# Patient Record
Sex: FEMALE | Race: NATIVE HAWAIIAN OR OTHER PACIFIC ISLANDER | Employment: UNEMPLOYED | URBAN - METROPOLITAN AREA
[De-identification: names, ages, dates, MRNs, and addresses within clinical notes are randomized per-mention and may not be internally consistent; named-entity substitution may affect disease eponyms.]

---

## 2018-06-28 ENCOUNTER — OP HISTORICAL/CONVERTED ENCOUNTER (OUTPATIENT)
Dept: OTHER | Age: 53
End: 2018-06-28

## 2018-12-12 ENCOUNTER — OFFICE VISIT (OUTPATIENT)
Dept: ENDOCRINOLOGY | Age: 53
End: 2018-12-12

## 2018-12-12 VITALS
OXYGEN SATURATION: 99 % | HEIGHT: 57 IN | HEART RATE: 84 BPM | DIASTOLIC BLOOD PRESSURE: 66 MMHG | SYSTOLIC BLOOD PRESSURE: 116 MMHG | WEIGHT: 123 LBS | BODY MASS INDEX: 26.54 KG/M2 | TEMPERATURE: 97.3 F | RESPIRATION RATE: 14 BRPM

## 2018-12-12 DIAGNOSIS — E55.9 VITAMIN D DEFICIENCY: ICD-10-CM

## 2018-12-12 DIAGNOSIS — M81.0 OSTEOPOROSIS, UNSPECIFIED OSTEOPOROSIS TYPE, UNSPECIFIED PATHOLOGICAL FRACTURE PRESENCE: Primary | ICD-10-CM

## 2018-12-12 RX ORDER — ASPIRIN 81 MG/1
81 TABLET ORAL DAILY
COMMUNITY
Start: 2018-12-11

## 2018-12-12 RX ORDER — SIMVASTATIN 20 MG/1
20 TABLET, FILM COATED ORAL
COMMUNITY
Start: 2018-09-20

## 2018-12-12 RX ORDER — MELOXICAM 7.5 MG/1
7.5 TABLET ORAL DAILY
COMMUNITY

## 2018-12-12 RX ORDER — LANOLIN ALCOHOL/MO/W.PET/CERES
1 CREAM (GRAM) TOPICAL DAILY
COMMUNITY
Start: 2018-09-20

## 2018-12-12 RX ORDER — ALENDRONATE SODIUM 70 MG/1
70 TABLET ORAL
Qty: 4 TAB | Refills: 2 | Status: SHIPPED | OUTPATIENT
Start: 2018-12-12 | End: 2018-12-13 | Stop reason: SDUPTHER

## 2018-12-12 RX ORDER — CLOBETASOL PROPIONATE 0.5 MG/G
CREAM TOPICAL
COMMUNITY
Start: 2018-10-01

## 2018-12-12 RX ORDER — MONTELUKAST SODIUM 10 MG/1
10 TABLET ORAL DAILY
COMMUNITY
Start: 2018-09-20

## 2018-12-12 RX ORDER — CONJUGATED ESTROGENS 0.62 MG/G
CREAM VAGINAL AS NEEDED
COMMUNITY
Start: 2018-10-01

## 2018-12-12 RX ORDER — CETIRIZINE HCL 10 MG
TABLET ORAL
COMMUNITY
Start: 2018-11-15

## 2018-12-12 NOTE — PROGRESS NOTES
Ariel Byers is a 52 y.o. female here for   Chief Complaint   Patient presents with    New Patient     referred by [facility]osis       1. Have you been to the ER, urgent care clinic since your last visit? Hospitalized since your last visit? -n/a    2. Have you seen or consulted any other health care providers outside of the [address] [place] [address] since your last visit?   Include any pap smears or colon screening.-n/a

## 2018-12-12 NOTE — PROGRESS NOTES
[facility]               Jaden Tejada MD        [address] [phone] Fax [phone]           Patient Information  Date:2018  Name : Ben Mercado 52 y.o.     YOB: 1965         Referred by: Carlos Alicea NP       Chief Complaint   Patient presents with    New Patient     referred by [facility] for Osteoporosis       History of Present Illness: Ben Mercado is a 52 y.o. female here for evaluation and  management of osteoporosis. she was found to have osteoporosis based on the BMD.   + premature menopause at age 50  No  loss of height  + GERD/no PUD  She had bone mineral density which showed decrease in the BMD in the hip ,slight improvement in the lumbar spine  Prior therapy : Actonel for 2 years, Boniva last 2 years      Dietary calcium Intake:  Minimal amount of dairy in her diet. On calcium and vitamin D supplements. No history of fragility fractures,chronic steroid use, hyperthyroidism. No history of excess alcohol or tobacco abuse. No known history of hypercalcemia,  Kidney stones, family history of kidney stones. Past Medical History:   Diagnosis Date    Hyperlipidemia     Osteoporosis     Postmenopausal      Past Surgical History:   Procedure Laterality Date    HX  SECTION  , , 2003    x3    HX COLONOSCOPY      HX TUBAL LIGATION       Current Outpatient Medications   Medication Sig    alendronate (FOSAMAX) 70 mg tablet Take 1 Tab by mouth every seven (7) days.  30 minutes before breakfast with water, stop Boniva    aspirin delayed-release 81 mg tablet     cetirizine (ZYRTEC) 10 mg tablet     montelukast (SINGULAIR) 10 mg tablet     clobetasol (TEMOVATE) 0.05 % topical cream     simvastatin (ZOCOR) 20 mg tablet     OYSTER SHELL CALCIUM-VIT D3 500 mg(1,250mg) -200 unit per tablet     PREMARIN 0.625 mg/gram vaginal cream     meloxicam (MOBIC) 7.5 mg tablet Take  by mouth daily. No current facility-administered medications for this visit. Allergies   Allergen Reactions    Septra [Sulfamethoxazole-Trimethoprim] Hives and Rash         Review of Systems:  All 10 systems  reviewed and are negative other than mentioned in HPI    Physical Examination:  Blood pressure 116/66, pulse 84, temperature 97.3 °F (36.3 °C), temperature source Oral, resp. rate 14, height 4' 8.8\" (1.443 m), weight 123 lb (55.8 kg), SpO2 99 %. Body mass index is 26.8 kg/m². - General: pleasant, no distress, good eye contact  - HEENT: no exopthalmos, no periorbital edema, no scleral/conjunctival injection, EOMI, no lid lag or stare  - Neck: supple, no thyromegaly, no supraclavicular or dorsocervical fat pads  - Cardiovascular: regular, normal rate, normal S1 and S2,   - Respiratory: clear to auscultation bilaterally  - Gastrointestinal: soft, nontender, nondistended, BS +  - Musculoskeletal: no proximal muscle weakness in upper or lower extremities  - Integumentary:  no rashes, no edema  - Neurological: reflexes 2+ at biceps, no tremor  - Psychiatric: normal mood and affect    Data Reviewed:         Assessment/Plan:     1. Osteoporosis, unspecified osteoporosis type, unspecified pathological fracture presence    2. Vitamin D deficiency        Osteoporosis    Will do basic labs to screen for secondary causes . She was on Boniva, compliant, there is no strong evidence of decreasing hip fractures with Boniva, instead alendronate, risendronate has strong data  Discussed different options, benefits and side effects -denosumab, zoledronic acid  until workup has been completed, switch to alendronate weekly, discontinue Boniva  Daily requirement of calcium is 1000 mg  1200 mg and Vitamin D is 1000 - 2000 Units daily (should  preferably be from food sources). Calcium rich food choices are  low fat dairy products, fortified orange juice,broccoli,salmon. Fall precautions. Weight bearing exercises helps.  Excess alcohol and smoking weakens the bone and hence should be avoided. Thank you for allowing me to participate in the care of this patient. Laura Julian MD    Patient Yobany Lion verbalized understanding      Patient Instructions   Bisphosphonates must  be taken with   6-8 oz plain water at least 30 minutes before the first food, drink, or medication of the day. Do not lie down for at least 30 minutes after taking . Report if you have any heart burn or jaw pain. Side effects:       Irritation of upper gastrointestinal mucosa, bone and  muscle pain may occur. Jaw necrosis has been reported which is usually seen in cancer patients and dental procedure. Atypical fractures(  low trauma fractures )of the femoral shaft have been reported. If new upper leg pain develop,contact the physician to rule out an incomplete  fracture. Follow-up Disposition:  Return in about 6 weeks (around 1/23/2019). Voice-recognition software was used to generate this report, which may result in some phonetic-based errors in the grammar and contents. Even though attempts were made to correct all the mistakes, some may have been missed and remained in the body of the report.

## 2018-12-12 NOTE — PATIENT INSTRUCTIONS
Bisphosphonates must  be taken with   6-8 oz plain water at least 30 minutes before the first food, drink, or medication of the day. Do not lie down for at least 30 minutes after taking . Report if you have any heart burn or jaw pain. Side effects:       Irritation of upper gastrointestinal mucosa, bone and  muscle pain may occur. Jaw necrosis has been reported which is usually seen in cancer patients and dental procedure. Atypical fractures(  low trauma fractures )of the femoral shaft have been reported. If new upper leg pain develop,contact the physician to rule out an incomplete  fracture.

## 2018-12-13 RX ORDER — ALENDRONATE SODIUM 70 MG/1
70 TABLET ORAL
Qty: 4 TAB | Refills: 2 | Status: SHIPPED | OUTPATIENT
Start: 2018-12-13 | End: 2019-02-02

## 2019-01-29 ENCOUNTER — OFFICE VISIT (OUTPATIENT)
Dept: ENDOCRINOLOGY | Age: 54
End: 2019-01-29

## 2019-01-29 VITALS
DIASTOLIC BLOOD PRESSURE: 63 MMHG | TEMPERATURE: 97.8 F | WEIGHT: 126 LBS | SYSTOLIC BLOOD PRESSURE: 97 MMHG | OXYGEN SATURATION: 95 % | HEART RATE: 89 BPM | RESPIRATION RATE: 14 BRPM | BODY MASS INDEX: 27.18 KG/M2 | HEIGHT: 57 IN

## 2019-01-29 DIAGNOSIS — M81.0 OSTEOPOROSIS WITHOUT CURRENT PATHOLOGICAL FRACTURE, UNSPECIFIED OSTEOPOROSIS TYPE: Primary | ICD-10-CM

## 2019-01-29 RX ORDER — BISMUTH SUBSALICYLATE 262 MG
1 TABLET,CHEWABLE ORAL DAILY
COMMUNITY

## 2019-01-29 NOTE — PROGRESS NOTES
Che Yusuf is a 52 y.o. female here for Chief Complaint Patient presents with  
 Osteoporosis 1. Have you been to the ER, urgent care clinic since your last visit? Hospitalized since your last visit? -no 
 
2. Have you seen or consulted any other health care providers outside of the [address] [place] [address] since your last visit?   Include any pap smears or colon screening.-no

## 2019-01-29 NOTE — PROGRESS NOTES
[facility] DIABETES AND ENDOCRINOLOGY Christene Goodpasture ,MD 
 
    [address] [phone] Fax [phone] Patient Information Date:1/29/2019 Name : Catrachita Oropeza 52 y.o.    
YOB: 1965 Referred by: Caryn Saunders NP Chief Complaint Patient presents with  
 Osteoporosis History of Present Illness: Edmar Moreno is a 52 y.o. female here for follow-up of osteoporosis. she was found to have osteoporosis based on the BMD.  
+ premature menopause at age 50 No  loss of height 
+ GERD/no PUD She had bone mineral density which showed decrease in the BMD in the hip ,slight improvement in the lumbar spine Prior therapy : Actonel for 2 years, Boniva last 2 years Dietary calcium Intake: 
Minimal amount of dairy in her diet. On calcium and vitamin D supplements. No history of fragility fractures,chronic steroid use, hyperthyroidism. No history of excess alcohol or tobacco abuse. No known history of hypercalcemia,  Kidney stones, family history of kidney stones. Past Medical History:  
Diagnosis Date  Hyperlipidemia  Osteoporosis  Postmenopausal   
 
Past Surgical History:  
Procedure Laterality Date [address] 2003  
 x3  
 HX COLONOSCOPY  2014  HX TUBAL LIGATION Current Outpatient Medications Medication Sig  
 multivitamin (ONE A DAY) tablet Take 1 Tab by mouth daily.  alendronate (FOSAMAX) 70 mg tablet Take 1 Tab by mouth every seven (7) days. 30 minutes before breakfast with water, stop Boniva  aspirin delayed-release 81 mg tablet  cetirizine (ZYRTEC) 10 mg tablet  montelukast (SINGULAIR) 10 mg tablet  clobetasol (TEMOVATE) 0.05 % topical cream   
 simvastatin (ZOCOR) 20 mg tablet  OYSTER SHELL CALCIUM-VIT D3 500 mg(1,250mg) -200 unit per tablet Take 1 Tab by mouth daily.   
 PREMARIN 0.625 mg/gram vaginal cream   
  meloxicam (MOBIC) 7.5 mg tablet Take  by mouth daily. No current facility-administered medications for this visit. Allergies Allergen Reactions  Septra [Sulfamethoxazole-Trimethoprim] Hives and Rash Review of Systems:  All 10 systems  reviewed and are negative other than mentioned in HPI Physical Examination: 
Blood pressure 97/63, pulse 89, temperature 97.8 °F (36.6 °C), temperature source Oral, resp. rate 14, height 4' 8.8\" (1.443 m), weight 126 lb (57.2 kg), SpO2 95 %. Body mass index is 27.46 kg/m². - General: pleasant, no distress, good eye contact 
- HEENT: no exopthalmos, no periorbital edema, no scleral/conjunctival injection, EOMI, no lid lag or stare 
- Neck: supple, no thyromegaly, no supraclavicular or dorsocervical fat pads - Cardiovascular: regular, normal rate, normal S1 and S2,  
- Respiratory: clear to auscultation bilaterally - Gastrointestinal: soft, nontender, nondistended, BS + 
- Musculoskeletal: no proximal muscle weakness in upper or lower extremities - Integumentary:  no rashes, no edema 
- Neurological: reflexes 2+ at biceps, no tremor - Psychiatric: normal mood and affect Data Reviewed:  
 
 
 
Assessment/Plan:  
 
No diagnosis found. Osteoporosis Workup for secondary causes unrevealing She was on Boniva, compliant, there is no strong evidence of decreasing hip fractures with Boniva, 
 
IV zoledronic acid has been scheduled , she was asked to discontinue alendronate once she gets the infusion Fall precautions. Weight bearing exercises helps. Excess alcohol and smoking weakens the bone and hence should be avoided. Thank you for allowing me to participate in the care of this patient. Aysha Vogel MD 
 
Patient Carly Patrick verbalized understanding There are no Patient Instructions on file for this visit. Follow-up Disposition: Not on File Voice-recognition software was used to generate this report, which may result in some phonetic-based errors in the grammar and contents. Even though attempts were made to correct all the mistakes, some may have been missed and remained in the body of the report.

## 2019-01-31 LAB
ALBUMIN SERPL ELPH-MCNC: 4.1 G/DL (ref 2.9–4.4)
ALBUMIN/GLOB SERPL: 1.2 {RATIO} (ref 0.7–1.7)
ALPHA1 GLOB SERPL ELPH-MCNC: 0.2 G/DL (ref 0–0.4)
ALPHA2 GLOB SERPL ELPH-MCNC: 0.7 G/DL (ref 0.4–1)
B-GLOBULIN SERPL ELPH-MCNC: 1.2 G/DL (ref 0.7–1.3)
BUN SERPL-MCNC: 17 MG/DL (ref 6–24)
BUN/CREAT SERPL: 18 (ref 9–23)
CALCIUM SERPL-MCNC: 10.2 MG/DL (ref 8.7–10.2)
CHLORIDE SERPL-SCNC: 103 MMOL/L (ref 96–106)
CO2 SERPL-SCNC: 24 MMOL/L (ref 20–29)
CREAT SERPL-MCNC: 0.93 MG/DL (ref 0.57–1)
GAMMA GLOB SERPL ELPH-MCNC: 1.1 G/DL (ref 0.4–1.8)
GLOBULIN SER CALC-MCNC: 3.3 G/DL (ref 2.2–3.9)
GLUCOSE SERPL-MCNC: 110 MG/DL (ref 65–99)
M PROTEIN SERPL ELPH-MCNC: NORMAL G/DL
PLEASE NOTE, 011150: NORMAL
POTASSIUM SERPL-SCNC: 4.7 MMOL/L (ref 3.5–5.2)
PROT SERPL-MCNC: 7.4 G/DL (ref 6–8.5)
SODIUM SERPL-SCNC: 142 MMOL/L (ref 134–144)
TRYPTASE SERPL-MCNC: 7 UG/L (ref 2.2–13.2)

## 2019-02-11 RX ORDER — ZOLEDRONIC ACID 5 MG/100ML
5 INJECTION, SOLUTION INTRAVENOUS ONCE
Status: DISCONTINUED | OUTPATIENT
Start: 2019-02-15 | End: 2019-02-15

## 2019-02-14 RX ORDER — ZOLEDRONIC ACID 5 MG/100ML
5 INJECTION, SOLUTION INTRAVENOUS ONCE
Status: COMPLETED | OUTPATIENT
Start: 2019-02-19 | End: 2019-02-19

## 2019-02-15 ENCOUNTER — HOSPITAL ENCOUNTER (OUTPATIENT)
Dept: INFUSION THERAPY | Age: 54
Discharge: HOME | End: 2019-02-15
Payer: OTHER GOVERNMENT

## 2019-02-19 ENCOUNTER — HOSPITAL ENCOUNTER (OUTPATIENT)
Dept: INFUSION THERAPY | Age: 54
Discharge: HOME | End: 2019-02-19
Payer: OTHER GOVERNMENT

## 2019-02-19 VITALS
HEART RATE: 64 BPM | TEMPERATURE: 97 F | SYSTOLIC BLOOD PRESSURE: 120 MMHG | RESPIRATION RATE: 18 BRPM | DIASTOLIC BLOOD PRESSURE: 74 MMHG

## 2019-02-19 PROCEDURE — 74011250636 HC RX REV CODE- 250/636: Performed by: INTERNAL MEDICINE

## 2019-02-19 PROCEDURE — 96374 THER/PROPH/DIAG INJ IV PUSH: CPT

## 2019-02-19 RX ADMIN — ZOLEDRONIC ACID 5 MG: 5 INJECTION, SOLUTION INTRAVENOUS at 13:10

## 2019-02-19 NOTE — PROGRESS NOTES
Outpatient Infusion Center Progress Note 1255 Pt admit to [address] [place] [address] for Reclast ambulatory in stable condition. Assessment completed. No new concerns voiced. PIV started in [facility] with positive blood return. Labs reviewed from 1/29/19, OK to treat. Medication discussed with pt including side effects and possibility of infusion reaction. Pt verbalized understanding. Instructed pt to take Tylenol as needed every 8 hours following infusion per orders for body aches/flu-like symptoms. Pt verbalized understanding. Visit Vitals /84 Pulse 83 Temp 97.7 °F (36.5 °C) Resp 18 Breastfeeding? No  
 
 
Medications: 
Reclast 
 
1355 Pt tolerated treatment well. Pt monitored for 30 minutes post infusion without any s/s of reaction. PIV flushed and removed prior to discharge. D/c home ambulatory in no distress. Pt aware of next appointment scheduled for 2/17/20.

## 2019-04-29 ENCOUNTER — OFFICE VISIT (OUTPATIENT)
Dept: ENDOCRINOLOGY | Age: 54
End: 2019-04-29

## 2019-04-29 VITALS
WEIGHT: 125.6 LBS | SYSTOLIC BLOOD PRESSURE: 106 MMHG | RESPIRATION RATE: 16 BRPM | HEART RATE: 99 BPM | DIASTOLIC BLOOD PRESSURE: 60 MMHG | TEMPERATURE: 97.3 F | BODY MASS INDEX: 27.1 KG/M2 | OXYGEN SATURATION: 99 % | HEIGHT: 57 IN

## 2019-04-29 DIAGNOSIS — M81.0 AGE-RELATED OSTEOPOROSIS WITHOUT CURRENT PATHOLOGICAL FRACTURE: Primary | ICD-10-CM

## 2019-04-29 DIAGNOSIS — E55.9 VITAMIN D DEFICIENCY: ICD-10-CM

## 2019-04-29 NOTE — PROGRESS NOTES
Camila Harrell MD        Patient Information  Date:2019  Name : Nikita Bailey 62 y.o.     YOB: 1965         Referred by: Damir Muñoz NP       Chief Complaint   Patient presents with    Osteoporosis       History of Present Illness: Nikita Bailey is a 62 y.o. female here for follow-up of osteoporosis. she was found to have osteoporosis based on the BMD.   + premature menopause at age 50  No  loss of height  + GERD/no PUD  She had bone mineral density which showed decrease in the BMD in the hip ,slight improvement in the lumbar spine  Prior therapy : Actonel for 2 years, Orbie Ogren last 2 years  She received Reclast in 2019, no myalgia    Dietary calcium Intake:  Minimal amount of dairy in her diet. On calcium and vitamin D supplements. Past Medical History:   Diagnosis Date    Hyperlipidemia     Osteoporosis     Postmenopausal      Past Surgical History:   Procedure Laterality Date    HX  SECTION  , , 2003    x3    HX COLONOSCOPY      HX TUBAL LIGATION       Current Outpatient Medications   Medication Sig    multivitamin (ONE A DAY) tablet Take 1 Tab by mouth daily.  aspirin delayed-release 81 mg tablet     cetirizine (ZYRTEC) 10 mg tablet     montelukast (SINGULAIR) 10 mg tablet     clobetasol (TEMOVATE) 0.05 % topical cream     simvastatin (ZOCOR) 20 mg tablet     OYSTER SHELL CALCIUM-VIT D3 500 mg(1,250mg) -200 unit per tablet Take 1 Tab by mouth daily.  PREMARIN 0.625 mg/gram vaginal cream     meloxicam (MOBIC) 7.5 mg tablet Take  by mouth daily. No current facility-administered medications for this visit.       Allergies   Allergen Reactions    Septra [Sulfamethoxazole-Trimethoprim] Hives and Rash         Review of Systems:  All 10 systems  reviewed and are negative other than mentioned in HPI    Physical Examination:  Blood pressure 106/60, pulse 99, temperature 97.3 °F (36.3 °C), temperature source Oral, resp. rate 16, height 4' 8.8\" (1.443 m), weight 125 lb 9.6 oz (57 kg), SpO2 99 %. Body mass index is 27.37 kg/m². - General: pleasant, no distress, good eye contact  - HEENT: no exopthalmos, no periorbital edema, no scleral/conjunctival injection, EOMI, no lid lag or stare  - Neck: supple,  - Cardiovascular: regular, normal rate, normal S1 and S2,   - Respiratory: clear to auscultation bilaterally  - Gastrointestinal: soft, nontender, nondistended, BS +  - Musculoskeletal: no proximal muscle weakness in upper or lower extremities  - Integumentary:  no rashes, no edema  - Neurological: reflexes 2+ at biceps, no tremor  - Psychiatric: normal mood and affect    Data Reviewed:         Assessment/Plan:     1. Age-related osteoporosis without current pathological fracture        Osteoporosis    Workup for secondary causes unrevealing  She was on Boniva in the past  IV Reclast February 2019    Fall precautions. Weight bearing exercises helps. Excess alcohol and smoking weakens the bone and hence should be avoided. DEXA due 7/2020     Vitamin D deficiency      Thank you for allowing me to participate in the care of this patient. Susan Almeida MD    Patient Alysha Odom verbalized understanding      There are no Patient Instructions on file for this visit. Voice-recognition software was used to generate this report, which may result in some phonetic-based errors in the grammar and contents. Even though attempts were made to correct all the mistakes, some may have been missed and remained in the body of the report.

## 2019-04-29 NOTE — LETTER
4/29/19 Patient: Carolyne Sauceda YOB: 1965 Date of Visit: 4/29/2019 Luc Cintron NP 
[address] [address] VIA Facsimile: [phone] Dear Luc Cintron NP, Thank you for referring Ms. Joe Garcia to [address] [place] [address] for evaluation. My notes for this consultation are attached. If you have questions, please do not hesitate to call me. I look forward to following your patient along with you. Sincerely, Harrison Graham MD

## 2019-04-29 NOTE — PROGRESS NOTES
Adriana Alfaro is a 62 y.o. female here for   Chief Complaint   Patient presents with    Osteoporosis       1. Have you been to the ER, urgent care clinic since your last visit? Hospitalized since your last visit? [facility]    2. Have you seen or consulted any other health care providers outside of the [address] [place] [address] since your last visit? Include any pap smears or colon screening. -PCP last week

## 2020-01-21 ENCOUNTER — HOSPITAL ENCOUNTER (OUTPATIENT)
Dept: LAB | Age: 55
Discharge: HOME | End: 2020-01-21

## 2020-01-21 ENCOUNTER — LAB ONLY (OUTPATIENT)
Dept: ENDOCRINOLOGY | Age: 55
End: 2020-01-21

## 2020-01-21 DIAGNOSIS — E55.9 VITAMIN D DEFICIENCY: ICD-10-CM

## 2020-01-21 DIAGNOSIS — M81.0 AGE-RELATED OSTEOPOROSIS WITHOUT CURRENT PATHOLOGICAL FRACTURE: ICD-10-CM

## 2020-01-21 DIAGNOSIS — M81.0 AGE-RELATED OSTEOPOROSIS WITHOUT CURRENT PATHOLOGICAL FRACTURE: Primary | ICD-10-CM

## 2020-01-21 LAB
25(OH)D3 SERPL-MCNC: 34.1 NG/ML (ref 30–100)
ANION GAP SERPL CALC-SCNC: 5 MMOL/L (ref 5–15)
BUN SERPL-MCNC: 18 MG/DL (ref 6–20)
BUN/CREAT SERPL: 18 (ref 12–20)
CALCIUM SERPL-MCNC: 9.5 MG/DL (ref 8.5–10.1)
CHLORIDE SERPL-SCNC: 108 MMOL/L (ref 97–108)
CO2 SERPL-SCNC: 29 MMOL/L (ref 21–32)
CREAT SERPL-MCNC: 0.98 MG/DL (ref 0.55–1.02)
GLUCOSE SERPL-MCNC: 95 MG/DL (ref 65–100)
POTASSIUM SERPL-SCNC: 4.1 MMOL/L (ref 3.5–5.1)
SODIUM SERPL-SCNC: 142 MMOL/L (ref 136–145)

## 2020-02-04 NOTE — PROGRESS NOTES
Estelle James is a 62 y.o. female here for   Chief Complaint   Patient presents with    Osteoporosis       1. Have you been to the ER, urgent care clinic since your last visit? Hospitalized since your last visit? -no    2. Have you seen or consulted any other health care providers outside of the [address] [place] [address] since your last visit? Include any pap smears or colon screening. -PCP

## 2020-02-05 ENCOUNTER — OFFICE VISIT (OUTPATIENT)
Dept: ENDOCRINOLOGY | Age: 55
End: 2020-02-05

## 2020-02-05 VITALS
DIASTOLIC BLOOD PRESSURE: 78 MMHG | HEART RATE: 75 BPM | WEIGHT: 123 LBS | TEMPERATURE: 97.4 F | HEIGHT: 57 IN | SYSTOLIC BLOOD PRESSURE: 124 MMHG | RESPIRATION RATE: 14 BRPM | OXYGEN SATURATION: 98 % | BODY MASS INDEX: 26.54 KG/M2

## 2020-02-05 DIAGNOSIS — E55.9 VITAMIN D DEFICIENCY: ICD-10-CM

## 2020-02-05 DIAGNOSIS — M81.0 AGE-RELATED OSTEOPOROSIS WITHOUT CURRENT PATHOLOGICAL FRACTURE: Primary | ICD-10-CM

## 2020-02-05 RX ORDER — IPRATROPIUM BROMIDE 42 UG/1
1 SPRAY, METERED NASAL DAILY
COMMUNITY
Start: 2020-01-28

## 2020-02-05 NOTE — LETTER
2/5/20 Patient: Monica Quintana YOB: 1965 Date of Visit: 2/5/2020 Rafia Reid NP 
[address] [address] VIA Facsimile: [phone] Dear Rafia Reid NP, Thank you for referring Ms. Claude Milling to [address] [place] [address] for evaluation. My notes for this consultation are attached. If you have questions, please do not hesitate to call me. I look forward to following your patient along with you. Sincerely, Valarie Fuchs MD

## 2020-02-05 NOTE — PROGRESS NOTES
Michele Sauceda MD        Patient Information  Date:2020  Name : Libby Jo 60 y.o.     YOB: 1965         Referred by: Tom Morales NP       Chief Complaint   Patient presents with    Osteoporosis       History of Present Illness: Libyb Jo is a 60 y.o. female here for follow-up of osteoporosis. she was found to have osteoporosis based on the BMD.   + premature menopause at age 50  No  loss of height  + GERD/no PUD  She had bone mineral density which showed decrease in the BMD in the hip ,slight improvement in the lumbar spine  Prior therapy : Actonel for 2 years, Viviana [place] last 2 years  She received Reclast in 2019, no myalgia  No dental issues  No falls    Dietary calcium Intake:  Minimal amount of dairy in her diet. On calcium and vitamin D supplements. Past Medical History:   Diagnosis Date    Hyperlipidemia     Osteoporosis     Postmenopausal      Past Surgical History:   Procedure Laterality Date    HX  SECTION  , , 2003    x3    HX COLONOSCOPY      HX TUBAL LIGATION       Current Outpatient Medications   Medication Sig    ipratropium (ATROVENT) 42 mcg (0.06 %) nasal spray 1 [place] by Both Nostrils route daily.  multivitamin (ONE A DAY) tablet Take 1 Tab by mouth daily.  aspirin delayed-release 81 mg tablet Take 81 mg by mouth daily.  cetirizine (ZYRTEC) 10 mg tablet     montelukast (SINGULAIR) 10 mg tablet Take 10 mg by mouth daily.  clobetasol (TEMOVATE) 0.05 % topical cream     simvastatin (ZOCOR) 20 mg tablet Take 20 mg by mouth nightly.  OYSTER SHELL CALCIUM-VIT D3 500 mg(1,250mg) -200 unit per tablet Take 1 Tab by mouth daily.  PREMARIN 0.625 mg/gram vaginal cream     meloxicam (MOBIC) 7.5 mg tablet Take 7.5 mg by mouth daily. No current facility-administered medications for this visit.       Allergies   Allergen Reactions    Septra [Sulfamethoxazole-Trimethoprim] Hives and Rash         Review of Systems:  All 10 systems  reviewed and are negative other than mentioned in HPI    Physical Examination:  Blood pressure 124/78, pulse 75, temperature 97.4 °F (36.3 °C), temperature source Oral, resp. rate 14, height 4' 8.8\" (1.443 m), weight 123 lb (55.8 kg), SpO2 98 %. Body mass index is 26.8 kg/m². - General: pleasant, no distress, good eye contact  - HEENT: no exopthalmos, no periorbital edema, no scleral/conjunctival injection, EOMI, no lid lag or stare  - Neck: supple,  - Cardiovascular: regular, normal rate, normal S1 and S2,   - Respiratory: clear to auscultation bilaterally  - Gastrointestinal: soft, nontender, nondistended, BS +  - Musculoskeletal: no proximal muscle weakness in upper or lower extremities  - Integumentary:  no rashes, no edema  - Neurological: reflexes 2+ at biceps, no tremor  - Psychiatric: normal mood and affect    Data Reviewed:         Assessment/Plan:     1. Age-related osteoporosis without current pathological fracture        Osteoporosis    Workup for secondary causes unrevealing: PTH, TSH, tryptase, 24-hour urine calcium, SPEP, BMP, vitamin D normal  She was on Boniva in the past  IV Reclast February 2019, February 2020 will be scheduled at infusion center    Fall precautions. Weight bearing exercises helps. Excess alcohol and smoking weakens the bone and hence should be avoided. DEXA due beginning of 2021    Vitamin D deficiency    GERD: Stable, no chronic PPI  Thank you for allowing me to participate in the care of this patient. Lori Mata MD    Patient Felipe Betancourt verbalized understanding      There are no Patient Instructions on file for this visit. Voice-recognition software was used to generate this report, which may result in some phonetic-based errors in the grammar and contents.   Even though attempts were made to correct all the mistakes, some may have been missed and remained in the body of the report.

## 2020-02-12 RX ORDER — SODIUM CHLORIDE 9 MG/ML
25 INJECTION, SOLUTION INTRAVENOUS CONTINUOUS
Status: DISPENSED | OUTPATIENT
Start: 2020-02-17 | End: 2020-02-17

## 2020-02-12 RX ORDER — ZOLEDRONIC ACID 5 MG/100ML
5 INJECTION, SOLUTION INTRAVENOUS ONCE
Status: COMPLETED | OUTPATIENT
Start: 2020-02-17 | End: 2020-02-17

## 2020-02-17 ENCOUNTER — HOSPITAL ENCOUNTER (OUTPATIENT)
Dept: INFUSION THERAPY | Age: 55
Discharge: HOME | End: 2020-02-17
Payer: OTHER GOVERNMENT

## 2020-02-17 VITALS
HEIGHT: 58 IN | BODY MASS INDEX: 25.82 KG/M2 | SYSTOLIC BLOOD PRESSURE: 102 MMHG | RESPIRATION RATE: 18 BRPM | WEIGHT: 123 LBS | DIASTOLIC BLOOD PRESSURE: 55 MMHG | TEMPERATURE: 98 F | HEART RATE: 72 BPM

## 2020-02-17 PROCEDURE — 96374 THER/PROPH/DIAG INJ IV PUSH: CPT

## 2020-02-17 PROCEDURE — 74011000258 HC RX REV CODE- 258: Performed by: INTERNAL MEDICINE

## 2020-02-17 PROCEDURE — 74011250636 HC RX REV CODE- 250/636: Performed by: INTERNAL MEDICINE

## 2020-02-17 RX ADMIN — ZOLEDRONIC ACID 5 MG: 5 INJECTION, SOLUTION INTRAVENOUS at 13:31

## 2020-02-17 RX ADMIN — SODIUM CHLORIDE 25 ML/HR: 900 INJECTION, SOLUTION INTRAVENOUS at 13:31

## 2020-02-17 NOTE — PROGRESS NOTES
[facility] Progress Note    Date: 2020    Name: Maddie Huang    MRN: [id number]         : 1965    Ms. Oropeza Arrived ambulatory and in no distress for ReclastRegimen. Assessment was completed, no acute issues at this time, no new complaints voiced. PIV placed in right [facility] without difficulty, no labs drawn. Ms. Any Koo vitals were reviewed. Visit Vitals  /55   Pulse 72   Temp 98 °F (36.7 °C)   Resp 18   Ht 4' 10\" (1.473 m)   Wt 55.8 kg (123 lb)   Breastfeeding No   BMI 25.71 kg/m²       Lab results were ob tained and reviewed. Labs obtained on 2/10/2020 within treatment parameters. Medications:  Medications Administered     0.9% sodium chloride infusion     Admin Date  2020 Action  New Bag Dose  25 mL/hr Rate  25 mL/hr Route  IntraVENous Administered By  Collins Chance RN          zoledronic acid (RECLAST) IVPB 5 mg     Admin Date  2020 Action  Given Dose  5 mg Rate  400 mL/hr Route  IntraVENous Administered By  Collins Chance RN                Ms. Korey Tamez tolerated treatment well and was discharged from [place] [address] in stable condition at 1400. PIV with positive blood return at end of treatment, flushed with saline and removed. Pressure dressing applied. Pt requests to scheduled next appointment closer to infusion date.      Ellen Freed RN  2020

## 2020-12-08 ENCOUNTER — TELEPHONE (OUTPATIENT)
Dept: ENDOCRINOLOGY | Age: 55
End: 2020-12-08

## 2020-12-08 DIAGNOSIS — M81.0 AGE-RELATED OSTEOPOROSIS WITHOUT CURRENT PATHOLOGICAL FRACTURE: Primary | ICD-10-CM

## 2020-12-08 DIAGNOSIS — E55.9 VITAMIN D DEFICIENCY: ICD-10-CM

## 2020-12-08 NOTE — TELEPHONE ENCOUNTER
Order placed for pt,  per Verbal Order with read back from Dr Daylin Jo 12/8/2020  Mailed 12/08/2020.

## 2021-01-13 LAB
25(OH)D3+25(OH)D2 SERPL-MCNC: 48.2 NG/ML (ref 30–100)
BUN SERPL-MCNC: 14 MG/DL (ref 6–24)
BUN/CREAT SERPL: 16 (ref 9–23)
CALCIUM SERPL-MCNC: 10.4 MG/DL (ref 8.7–10.2)
CHLORIDE SERPL-SCNC: 103 MMOL/L (ref 96–106)
CO2 SERPL-SCNC: 24 MMOL/L (ref 20–29)
CREAT SERPL-MCNC: 0.85 MG/DL (ref 0.57–1)
GLUCOSE SERPL-MCNC: 92 MG/DL (ref 65–99)
POTASSIUM SERPL-SCNC: 4.8 MMOL/L (ref 3.5–5.2)
SODIUM SERPL-SCNC: 142 MMOL/L (ref 134–144)

## 2021-02-08 NOTE — CALL BACK NOTE
New/updated order required for patient's upcoming [facility] appointment. Faxed doctor's office on 02/08/21 at 10:01 AM.  Refer to fax documents in pharmacy for further information.

## 2021-02-10 ENCOUNTER — OFFICE VISIT (OUTPATIENT)
Dept: ENDOCRINOLOGY | Age: 56
End: 2021-02-10
Payer: OTHER GOVERNMENT

## 2021-02-10 VITALS
DIASTOLIC BLOOD PRESSURE: 70 MMHG | WEIGHT: 123 LBS | BODY MASS INDEX: 26.54 KG/M2 | HEART RATE: 78 BPM | OXYGEN SATURATION: 97 % | TEMPERATURE: 97.4 F | SYSTOLIC BLOOD PRESSURE: 107 MMHG | HEIGHT: 57 IN | RESPIRATION RATE: 14 BRPM

## 2021-02-10 DIAGNOSIS — M81.0 AGE-RELATED OSTEOPOROSIS WITHOUT CURRENT PATHOLOGICAL FRACTURE: Primary | ICD-10-CM

## 2021-02-10 DIAGNOSIS — E55.9 VITAMIN D DEFICIENCY: ICD-10-CM

## 2021-02-10 PROCEDURE — 99214 OFFICE O/P EST MOD 30 MIN: CPT | Performed by: INTERNAL MEDICINE

## 2021-02-10 RX ORDER — OMEPRAZOLE 10 MG/1
1 CAPSULE, DELAYED RELEASE ORAL DAILY
COMMUNITY
Start: 2021-01-19

## 2021-02-10 NOTE — PROGRESS NOTES
Junie Mariee is a 60 y.o. female here for   Chief Complaint   Patient presents with    Osteoporosis       1. Have you been to the ER, urgent care clinic since your last visit? Hospitalized since your last visit? -no    2. Have you seen or consulted any other health care providers outside of the [address] [place] [address] since your last visit?   Include any pap smears or colon screening.-no

## 2021-02-10 NOTE — PROGRESS NOTES
Kota Newsome MD        Patient Information  Date:2/10/2021  Name : Randy Fraire 60 y.o.     YOB: 1965         Referred by: Alexandria Joel NP       Chief Complaint   Patient presents with    Osteoporosis       History of Present Illness: Randy Fraire is a 60 y.o. female here for follow-up of osteoporosis. she was found to have osteoporosis based on the BMD.   + premature menopause at age 50  No  loss of height  + GERD/no PUD  Prior therapy : Actonel for 2 years, [facility] Other last 2 years  She is on IV Reclast  No dental issues  No falls    Dietary calcium Intake:  Minimal amount of dairy in her diet. On calcium and vitamin D supplements. Past Medical History:   Diagnosis Date    Hyperlipidemia     Osteoporosis     Postmenopausal      Past Surgical History:   Procedure Laterality Date    HX  SECTION  , , 2003    x3    HX COLONOSCOPY      HX TUBAL LIGATION       Current Outpatient Medications   Medication Sig    omeprazole (PRILOSEC) 10 mg capsule Take 1 Cap by mouth daily.  ipratropium (ATROVENT) 42 mcg (0.06 %) nasal spray 1 [place] by Both Nostrils route daily.  multivitamin (ONE A DAY) tablet Take 1 Tab by mouth daily.  aspirin delayed-release 81 mg tablet Take 81 mg by mouth daily.  clobetasol (TEMOVATE) 0.05 % topical cream     simvastatin (ZOCOR) 20 mg tablet Take 20 mg by mouth nightly.  OYSTER SHELL CALCIUM-VIT D3 500 mg(1,250mg) -200 unit per tablet Take 1 Tab by mouth daily.  PREMARIN 0.625 mg/gram vaginal cream as needed.  meloxicam (MOBIC) 7.5 mg tablet Take 7.5 mg by mouth daily.  cetirizine (ZYRTEC) 10 mg tablet     montelukast (SINGULAIR) 10 mg tablet Take 10 mg by mouth daily. No current facility-administered medications for this visit. Allergies   Allergen Reactions    Septra [Sulfamethoxazole-Trimethoprim] Hives and Rash         Review of Systems:   All 10 systems  reviewed and are negative other than mentioned in HPI    Physical Examination:  Blood pressure 107/70, pulse 78, temperature 97.4 °F (36.3 °C), temperature source Oral, resp. rate 14, height 4' 8.8\" (1.443 m), weight 123 lb (55.8 kg), SpO2 97 %. Body mass index is 26.8 kg/m². - General: pleasant, no distress, good eye contact  - HEENT: no exopthalmos, no periorbital edema, no scleral/conjunctival injection, EOMI, no lid lag or stare  - Neck: supple,  - Cardiovascular: regular, normal rate, normal S1 and S2,   - Respiratory: clear to auscultation bilaterally  -   - Musculoskeletal: no proximal muscle weakness in upper or lower extremities  - Integumentary:  no rashes, no edema  - Neurological: Alert and oriented x3  - Psychiatric: normal mood and affect    Data Reviewed:         Assessment/Plan:     1. Age-related osteoporosis without current pathological fracture        Osteoporosis    Workup for secondary causes unrevealing: PTH, TSH, tryptase, 24-hour urine calcium, SPEP, BMP, vitamin D normal  She was on Boniva in the past  IV Reclast February 2019, February 2020, March 2021 will be scheduled    Fall precautions. Weight bearing exercises helps. Excess alcohol and smoking weakens the bone and hence should be avoided. DEXA ordered    Vitamin D deficiency    GERD: Stable, avoid PPI    Thank you for allowing me to participate in the care of this patient. Pedro Colon MD    Patient Td Flores verbalized understanding      There are no Patient Instructions on file for this visit. Voice-recognition software was used to generate this report, which may result in some phonetic-based errors in the grammar and contents. Even though attempts were made to correct all the mistakes, some may have been missed and remained in the body of the report.

## 2021-02-10 NOTE — PATIENT INSTRUCTIONS
I have ordered scan/test and if you do not hear from the hospital in 3- 4 business days  please call the number [phone] to speak with the scheduling team directly.

## 2021-02-10 NOTE — LETTER
2/10/2021 Patient: Cecilia Livingston YOB: 1965 Date of Visit: 2/10/2021 Tiera Hawk NP 
[address] [address] Via Fax: [phone] Dear Tiera Hawk NP, Thank you for referring Ms. Tong Soto to [address] [place] [address] for evaluation. My notes for this consultation are attached. If you have questions, please do not hesitate to call me. I look forward to following your patient along with you. Sincerely, Chari Serrano MD

## 2021-02-15 ENCOUNTER — HOSPITAL ENCOUNTER (OUTPATIENT)
Dept: INFUSION THERAPY | Age: 56
Discharge: HOME | End: 2021-02-15

## 2021-02-18 ENCOUNTER — HOSPITAL ENCOUNTER (OUTPATIENT)
Dept: MAMMOGRAPHY | Age: 56
Discharge: HOME | End: 2021-02-18
Payer: OTHER GOVERNMENT

## 2021-02-18 DIAGNOSIS — M81.0 AGE-RELATED OSTEOPOROSIS WITHOUT CURRENT PATHOLOGICAL FRACTURE: ICD-10-CM

## 2021-02-18 PROCEDURE — 77080 DXA BONE DENSITY AXIAL: CPT

## 2021-03-23 RX ORDER — ZOLEDRONIC ACID 5 MG/100ML
5 INJECTION, SOLUTION INTRAVENOUS ONCE
Status: COMPLETED | OUTPATIENT
Start: 2021-03-30 | End: 2021-03-30

## 2021-03-30 ENCOUNTER — HOSPITAL ENCOUNTER (OUTPATIENT)
Dept: INFUSION THERAPY | Age: 56
Discharge: HOME | End: 2021-03-30
Payer: OTHER GOVERNMENT

## 2021-03-30 VITALS
HEART RATE: 85 BPM | OXYGEN SATURATION: 96 % | WEIGHT: 120 LBS | BODY MASS INDEX: 24.19 KG/M2 | RESPIRATION RATE: 16 BRPM | DIASTOLIC BLOOD PRESSURE: 67 MMHG | HEIGHT: 59 IN | TEMPERATURE: 97 F | SYSTOLIC BLOOD PRESSURE: 114 MMHG

## 2021-03-30 LAB
ANION GAP BLD CALC-SCNC: 15 MMOL/L (ref 10–20)
BUN BLD-MCNC: 17 MG/DL (ref 9–20)
CA-I BLD-MCNC: 1.26 MMOL/L (ref 1.12–1.32)
CHLORIDE BLD-SCNC: 102 MMOL/L (ref 98–107)
CO2 BLD-SCNC: 29 MMOL/L (ref 21–32)
CREAT BLD-MCNC: 1.2 MG/DL (ref 0.6–1.3)
GLUCOSE BLD-MCNC: 99 MG/DL (ref 65–100)
HCT VFR BLD CALC: 42 % (ref 35–47)
POTASSIUM BLD-SCNC: 4 MMOL/L (ref 3.5–5.1)
SERVICE CMNT-IMP: ABNORMAL
SODIUM BLD-SCNC: 140 MMOL/L (ref 136–145)

## 2021-03-30 PROCEDURE — 80047 BASIC METABLC PNL IONIZED CA: CPT

## 2021-03-30 PROCEDURE — 96374 THER/PROPH/DIAG INJ IV PUSH: CPT

## 2021-03-30 PROCEDURE — 80048 BASIC METABOLIC PNL TOTAL CA: CPT

## 2021-03-30 PROCEDURE — 74011250636 HC RX REV CODE- 250/636: Performed by: INTERNAL MEDICINE

## 2021-03-30 RX ADMIN — ZOLEDRONIC ACID 5 MG: 5 INJECTION, SOLUTION INTRAVENOUS at 14:11

## 2021-03-30 NOTE — PROGRESS NOTES
Outpatient Infusion Center Short Visit Progress Note    1320 Patient admitted to [facility] for yearly Reclast ambulatory in stable condition. Assessment completed. No new concerns voiced. RN verified that patient has not had any recent major dental work done. Covid Screening      1. Do you have any symptoms of COVID-19? SOB, coughing, fever, or generally not feeling well ? NO  2. Have you been exposed to COVID-19 recently? NO  3. Have you had any recent contact with family/friend that has a pending COVID test? NO    Vital Signs:  Visit Vitals  /67   Pulse 85   Temp 97 °F (36.1 °C)   Resp 16   Ht 4' 11\" (1.499 m)   Wt 54.4 kg (120 lb)   SpO2 96%   Breastfeeding No   BMI 24.24 kg/m²         24 G PIV placed in right AC with positive blood return. Lab Results:  Recent Results (from the past 12 hour(s))   POC CHEM8    Collection Time: 03/30/21  1:44 PM   Result Value Ref Range    Calcium, ionized (POC) 1.26 1.12 - 1.32 mmol/L    Sodium (POC) 140 136 - 145 mmol/L    Potassium (POC) 4.0 3.5 - 5.1 mmol/L    Chloride (POC) 102 98 - 107 mmol/L    CO2 (POC) 29 21 - 32 mmol/L    Anion gap (POC) 15 10 - 20 mmol/L    Glucose (POC) 99 65 - 100 mg/dL    BUN (POC) 17 9 - 20 mg/dL    Creatinine (POC) 1.2 0.6 - 1.3 mg/dL    GFRAA, POC 56 (L) >60 ml/min/1.73m2    GFRNA, POC 46 (L) >60 ml/min/1.73m2    Hematocrit (POC) 42 35.0 - 47.0 %    Comment Comment Not Indicated. Medications:  Medications Administered     zoledronic acid (RECLAST) IVPB 5 mg     Admin Date  03/30/2021 Action  Given Dose  5 mg Rate  400 mL/hr Route  IntraVENous Administered By  Rocco Nielsen RN                 Patient tolerated treatment well. Patient discharged from [facility] ambulatory in no distress at 1430. Patient to contact provider for future appointments.     Future Appointments   Date Time Provider Genaro Reeves   2/10/2022 11:30 AM Ross Lawson MD CDE [facility]

## 2022-01-24 ENCOUNTER — TELEPHONE (OUTPATIENT)
Dept: ENDOCRINOLOGY | Age: 57
End: 2022-01-24

## 2022-01-24 NOTE — TELEPHONE ENCOUNTER
Patient at [facility] now was wondering if you can redo labs with current date, the one she has .  Fax is [phone] Thank you

## 2022-01-25 ENCOUNTER — TELEPHONE (OUTPATIENT)
Dept: ENDOCRINOLOGY | Age: 57
End: 2022-01-25

## 2022-01-25 DIAGNOSIS — M81.0 AGE-RELATED OSTEOPOROSIS WITHOUT CURRENT PATHOLOGICAL FRACTURE: Primary | ICD-10-CM

## 2022-01-25 DIAGNOSIS — E55.9 VITAMIN D DEFICIENCY: ICD-10-CM

## 2022-01-25 LAB — CREATININE, EXTERNAL: 0.9

## 2022-01-25 NOTE — TELEPHONE ENCOUNTER
Patient asking for a call regarding a deepa lab form please inform her to leave complete messages she did not leave a name or  only a phone number

## 2022-01-25 NOTE — TELEPHONE ENCOUNTER
Pt states her lab order has the wrong date. Its to old. Has to be within 60 days she says. Please advise if need more info.  ANGEL

## 2022-01-25 NOTE — TELEPHONE ENCOUNTER
Date changed, Order placed for pt,  per Verbal Order with read back from Dr Gregg Casillas 1/25/2022    Faxed to Jag at [phone].

## 2022-02-10 ENCOUNTER — OFFICE VISIT (OUTPATIENT)
Dept: ENDOCRINOLOGY | Age: 57
End: 2022-02-10
Payer: OTHER GOVERNMENT

## 2022-02-10 VITALS
WEIGHT: 119 LBS | BODY MASS INDEX: 25.67 KG/M2 | DIASTOLIC BLOOD PRESSURE: 68 MMHG | TEMPERATURE: 96.6 F | RESPIRATION RATE: 16 BRPM | SYSTOLIC BLOOD PRESSURE: 113 MMHG | HEART RATE: 89 BPM | OXYGEN SATURATION: 94 % | HEIGHT: 57 IN

## 2022-02-10 DIAGNOSIS — E55.9 VITAMIN D DEFICIENCY: ICD-10-CM

## 2022-02-10 DIAGNOSIS — M81.0 AGE-RELATED OSTEOPOROSIS WITHOUT CURRENT PATHOLOGICAL FRACTURE: Primary | ICD-10-CM

## 2022-02-10 PROCEDURE — 99214 OFFICE O/P EST MOD 30 MIN: CPT | Performed by: INTERNAL MEDICINE

## 2022-02-10 NOTE — PROGRESS NOTES
Geraldine Mcdonald MD        Patient Information  Date:2/10/2022  Name : Claribel Henriquez 59 y.o.     YOB: 1965         Referred by: Danuta Abraham NP       Chief Complaint   Patient presents with    Osteoporosis       History of Present Illness: Claribel Henriquez is a 59 y.o. female here for follow-up of osteoporosis. she was found to have osteoporosis based on the BMD.   + premature menopause at age 50  No  loss of height  + GERD/no PUD  Prior therapy : Actonel for 2 years, Euell Barge last 2 years  Last dose of Reclast was   No dental issues  No falls    Dietary calcium Intake:  Minimal amount of dairy in her diet. On calcium and vitamin D supplements. Past Medical History:   Diagnosis Date    Hyperlipidemia     Osteoporosis     Postmenopausal      Past Surgical History:   Procedure Laterality Date    HX  SECTION  , , 2003    x3    HX COLONOSCOPY      HX TUBAL LIGATION       Current Outpatient Medications   Medication Sig    omeprazole (PRILOSEC) 10 mg capsule Take 1 Cap by mouth daily.  ipratropium (ATROVENT) 42 mcg (0.06 %) nasal spray 1 [place] by Both Nostrils route daily.  multivitamin (ONE A DAY) tablet Take 1 Tab by mouth daily.  aspirin delayed-release 81 mg tablet Take 81 mg by mouth daily.  clobetasol (TEMOVATE) 0.05 % topical cream     simvastatin (ZOCOR) 20 mg tablet Take 20 mg by mouth nightly.  OYSTER SHELL CALCIUM-VIT D3 500 mg(1,250mg) -200 unit per tablet Take 1 Tab by mouth daily.  PREMARIN 0.625 mg/gram vaginal cream as needed.  meloxicam (MOBIC) 7.5 mg tablet Take 7.5 mg by mouth daily.  cetirizine (ZYRTEC) 10 mg tablet  (Patient not taking: Reported on 2/10/2022)    montelukast (SINGULAIR) 10 mg tablet Take 10 mg by mouth daily. (Patient not taking: Reported on 2/10/2022)     No current facility-administered medications for this visit.      Allergies   Allergen Reactions    Septra [Sulfamethoxazole-Trimethoprim] Hives and Rash         Review of Systems: Per HPI    Physical Examination:  Blood pressure 113/68, pulse 89, temperature (!) 96.6 °F (35.9 °C), temperature source Temporal, resp. rate 16, height 4' 9\" (1.448 m), weight 119 lb (54 kg), SpO2 94 %. Body mass index is 25.75 kg/m². - General: pleasant, no distress, good eye contact  - HEENT: no exopthalmos, no periorbital edema, no scleral/conjunctival injection, EOMI, no lid lag or stare  - Neck: supple,  - Cardiovascular: regular, normal rate, normal S1 and S2,   - Respiratory: clear to auscultation bilaterally  -   - Musculoskeletal: no proximal muscle weakness in upper or lower extremities  - Integumentary:  no rashes, no edema  - Neurological: Alert and oriented x3  - Psychiatric: normal mood and affect    Data Reviewed:         Assessment/Plan:     1. Age-related osteoporosis without current pathological fracture    2. Vitamin D deficiency        Osteoporosis    Workup for secondary causes unrevealing: PTH, TSH, tryptase, 24-hour urine calcium, SPEP, BMP, vitamin D normal  She was on Boniva in the past  IV Reclast February 2019, February 2020, March 2021 , Drug holiday     Fall precautions. Weight bearing exercises helps. Excess alcohol and smoking weakens the bone and hence should be avoided. DEXA 2021/Feb     DEXA 2/2023     Vitamin D deficiency - 1000 units - 2000 units     GERD: Stable, takes PPI as needed     Thank you for allowing me to participate in the care of this patient. Lu Batres MD    Patient Kiet Zhao verbalized understanding      There are no Patient Instructions on file for this visit. Follow-up and Dispositions    · Return in about 17 months (around 7/10/2023) for labs before next visit and follow up. Voice-recognition software was used to generate this report, which may result in some phonetic-based errors in the grammar and contents.   Even though attempts were made to correct all the mistakes, some may have been missed and remained in the body of the report.

## 2022-03-19 PROBLEM — M81.0 AGE-RELATED OSTEOPOROSIS WITHOUT CURRENT PATHOLOGICAL FRACTURE: Status: ACTIVE | Noted: 2019-04-29

## 2023-05-22 RX ORDER — MONTELUKAST SODIUM 10 MG/1
10 TABLET ORAL DAILY
COMMUNITY
Start: 2018-09-20

## 2023-05-22 RX ORDER — CONJUGATED ESTROGENS 0.62 MG/G
CREAM VAGINAL PRN
COMMUNITY
Start: 2018-10-01

## 2023-05-22 RX ORDER — CETIRIZINE HYDROCHLORIDE 10 MG/1
TABLET ORAL
COMMUNITY
Start: 2018-11-15

## 2023-05-22 RX ORDER — CLOBETASOL PROPIONATE 0.5 MG/G
CREAM TOPICAL
COMMUNITY
Start: 2018-10-01

## 2023-05-22 RX ORDER — MELOXICAM 7.5 MG/1
7.5 TABLET ORAL DAILY
COMMUNITY

## 2023-05-22 RX ORDER — OMEPRAZOLE 10 MG/1
1 CAPSULE, DELAYED RELEASE ORAL AS NEEDED
COMMUNITY
Start: 2021-01-19

## 2023-05-22 RX ORDER — SIMVASTATIN 20 MG
20 TABLET ORAL NIGHTLY
COMMUNITY
Start: 2018-09-20

## 2023-05-22 RX ORDER — IPRATROPIUM BROMIDE 42 UG/1
1 SPRAY, METERED NASAL DAILY
COMMUNITY
Start: 2020-01-28

## 2023-05-22 RX ORDER — B-COMPLEX WITH VITAMIN C
1 TABLET ORAL DAILY
COMMUNITY
Start: 2018-09-20

## 2023-05-22 RX ORDER — ASPIRIN 81 MG/1
81 TABLET ORAL DAILY
COMMUNITY
Start: 2018-12-11

## 2023-07-03 LAB — CREATININE, EXTERNAL: 0.8

## 2023-07-13 ENCOUNTER — OFFICE VISIT (OUTPATIENT)
Age: 58
End: 2023-07-13
Payer: OTHER GOVERNMENT

## 2023-07-13 VITALS
WEIGHT: 113 LBS | SYSTOLIC BLOOD PRESSURE: 107 MMHG | HEART RATE: 80 BPM | DIASTOLIC BLOOD PRESSURE: 57 MMHG | RESPIRATION RATE: 16 BRPM | TEMPERATURE: 98.7 F | OXYGEN SATURATION: 97 % | BODY MASS INDEX: 24.38 KG/M2 | HEIGHT: 57 IN

## 2023-07-13 DIAGNOSIS — E55.9 VITAMIN D DEFICIENCY: ICD-10-CM

## 2023-07-13 DIAGNOSIS — M81.0 AGE-RELATED OSTEOPOROSIS WITHOUT CURRENT PATHOLOGICAL FRACTURE: Primary | ICD-10-CM

## 2023-07-13 PROBLEM — M21.969: Status: ACTIVE | Noted: 2023-07-13

## 2023-07-13 PROBLEM — L90.0 LICHEN SCLEROSUS ET ATROPHICUS: Status: ACTIVE | Noted: 2023-07-13

## 2023-07-13 PROBLEM — H18.419 ARCUS SENILIS: Status: ACTIVE | Noted: 2023-07-13

## 2023-07-13 PROBLEM — J30.9 ALLERGIC RHINITIS, UNSPECIFIED: Status: ACTIVE | Noted: 2023-07-13

## 2023-07-13 PROBLEM — Q14.1 CONGENITAL HYPERTROPHY OF RETINAL PIGMENT EPITHELIUM OF LEFT EYE: Status: ACTIVE | Noted: 2023-07-13

## 2023-07-13 PROBLEM — R12 HEARTBURN: Status: ACTIVE | Noted: 2023-07-13

## 2023-07-13 PROCEDURE — 99214 OFFICE O/P EST MOD 30 MIN: CPT | Performed by: INTERNAL MEDICINE

## 2023-07-13 RX ORDER — VITAMIN B COMPLEX
TABLET ORAL
COMMUNITY
Start: 2022-12-13 | End: 2023-12-12

## 2023-07-13 RX ORDER — ROSUVASTATIN CALCIUM 20 MG/1
20 TABLET, FILM COATED ORAL DAILY
COMMUNITY

## 2023-07-13 NOTE — PROGRESS NOTES
Paola Villafana is a 59 y.o. female here for   Chief Complaint   Patient presents with    Osteoporosis     LV 2/2022       1. Have you been to the ER, urgent care clinic since your last visit? Hospitalized since your last visit? -no    2. Have you seen or consulted any other health care providers outside of the [address] [place] [address] Avenue since your last visit? Include any pap smears or colon screening. -PCP

## 2023-07-13 NOTE — PATIENT INSTRUCTIONS
I have ordered scan/test and if you do not hear from the hospital in 3- 4 business days  please call the number [phone] to speak with the scheduling team directly.

## 2023-07-13 NOTE — PROGRESS NOTES
Gigi Bailey MD            Patient Information   Date:2/10/2022   Name : Jr Patterson 59 y.o.       YOB: 1965           Referred by: Meron Kamara NP         Chief complaint: Osteoporosis     History of Present Illness: Jr Patterson is a  59 y.o. female here for follow-up of osteoporosis. she was found to have osteoporosis based on the BMD.    + premature menopause at age 50   No  loss of height   + GERD/no PUD   Prior therapy : Actonel for 2 years, [facility] Rias last 2 years   Last dose of Reclast was 2021   No dental issues   No falls      Dietary calcium Intake:   Minimal amount of dairy in her diet. On calcium and vitamin D supplements.         Wt Readings from Last 3 Encounters:   07/13/23 113 lb (51.3 kg)   02/10/22 119 lb (54 kg)   12/12/18 123 lb (55.8 kg)        Past Medical History:   Diagnosis Date    Hyperlipidemia     Osteoporosis     Postmenopausal        Current Outpatient Medications   Medication Sig    CRESTOR 20 MG tablet Take 1 tablet by mouth daily    Multiple Vitamin (MULTIVITAMIN ADULT PO) Take 1 tablet by mouth daily    Vitamin D (CHOLECALCIFEROL) 25 MCG (1000 UT) TABS tablet   See dose instructions in comments, # 90 EA, 2 total refill(s), Acute, JENNIFER [Federal Rx: #90 last filled 06/14/23]    aspirin 81 MG EC tablet Take 1 tablet by mouth daily    Calcium Carbonate-Vitamin D (OYSTER SHELL CALCIUM/D) 500-5 MG-MCG TABS Take 1 tablet by mouth daily    cetirizine (ZYRTEC) 10 MG tablet ceived the following from [address]: Outside name: cetirizine (ZYRTEC) 10 mg tablet    estrogens conjugated (PREMARIN) 0.625 MG/GM CREA vaginal cream as needed    ipratropium (ATROVENT) 0.06 % nasal spray 1 spray by Nasal route daily    meloxicam (MOBIC) 7.5 MG tablet Take 1 tablet by mouth daily    omeprazole (PRILOSEC) 10 MG delayed release capsule Take 1 capsule by mouth as needed    clobetasol (TEMOVATE) 0.05

## 2023-07-18 ENCOUNTER — HOSPITAL ENCOUNTER (OUTPATIENT)
Facility: HOSPITAL | Age: 58
Discharge: HOME | End: 2023-07-21
Payer: OTHER GOVERNMENT

## 2023-07-18 ENCOUNTER — TELEPHONE (OUTPATIENT)
Age: 58
End: 2023-07-18

## 2023-07-18 DIAGNOSIS — M81.0 AGE-RELATED OSTEOPOROSIS WITHOUT CURRENT PATHOLOGICAL FRACTURE: ICD-10-CM

## 2023-07-18 PROCEDURE — 77080 DXA BONE DENSITY AXIAL: CPT

## 2023-07-18 NOTE — TELEPHONE ENCOUNTER
Pt notified of results and informed that Tevin Hines will call to set up reclast appt. Pt gave verbal understanding.

## 2023-07-18 NOTE — TELEPHONE ENCOUNTER
----- Message from Mak Cunningham MD sent at 7/18/2023  4:15 PM EDT -----  Need Reclast, order to be sent to [address] [place] [address]

## 2023-07-26 RX ORDER — ACETAMINOPHEN 325 MG/1
650 TABLET ORAL
Status: CANCELLED | OUTPATIENT
Start: 2023-08-10

## 2023-07-26 RX ORDER — SODIUM CHLORIDE 0.9 % (FLUSH) 0.9 %
5-40 SYRINGE (ML) INJECTION PRN
Status: CANCELLED | OUTPATIENT
Start: 2023-08-10

## 2023-07-26 RX ORDER — ZOLEDRONIC ACID 5 MG/100ML
5 INJECTION, SOLUTION INTRAVENOUS ONCE
Status: CANCELLED | OUTPATIENT
Start: 2023-08-10 | End: 2023-08-10

## 2023-07-26 RX ORDER — SODIUM CHLORIDE 9 MG/ML
5-250 INJECTION, SOLUTION INTRAVENOUS PRN
Status: CANCELLED | OUTPATIENT
Start: 2023-08-10

## 2023-08-04 DIAGNOSIS — M81.0 AGE-RELATED OSTEOPOROSIS WITHOUT CURRENT PATHOLOGICAL FRACTURE: Primary | ICD-10-CM

## 2023-08-04 RX ORDER — ZOLEDRONIC ACID 5 MG/100ML
5 INJECTION, SOLUTION INTRAVENOUS ONCE
OUTPATIENT
Start: 2023-08-10 | End: 2023-08-10

## 2023-08-04 RX ORDER — ACETAMINOPHEN 325 MG/1
650 TABLET ORAL
OUTPATIENT
Start: 2023-08-10

## 2023-08-04 RX ORDER — SODIUM CHLORIDE 9 MG/ML
5-250 INJECTION, SOLUTION INTRAVENOUS PRN
OUTPATIENT
Start: 2023-08-10

## 2023-08-04 RX ORDER — SODIUM CHLORIDE 0.9 % (FLUSH) 0.9 %
5-40 SYRINGE (ML) INJECTION PRN
OUTPATIENT
Start: 2023-08-10

## 2023-08-10 ENCOUNTER — HOSPITAL ENCOUNTER (OUTPATIENT)
Facility: HOSPITAL | Age: 58
Setting detail: INFUSION SERIES
End: 2023-08-10
Payer: OTHER GOVERNMENT

## 2023-08-10 VITALS
WEIGHT: 115.8 LBS | TEMPERATURE: 98.6 F | SYSTOLIC BLOOD PRESSURE: 120 MMHG | OXYGEN SATURATION: 97 % | HEART RATE: 68 BPM | BODY MASS INDEX: 24.98 KG/M2 | RESPIRATION RATE: 16 BRPM | HEIGHT: 57 IN | DIASTOLIC BLOOD PRESSURE: 61 MMHG

## 2023-08-10 DIAGNOSIS — M81.0 AGE-RELATED OSTEOPOROSIS WITHOUT CURRENT PATHOLOGICAL FRACTURE: Primary | ICD-10-CM

## 2023-08-10 LAB
ANION GAP BLD CALC-SCNC: 9 MMOL/L (ref 10–20)
CA-I BLD-MCNC: 1.21 MMOL/L (ref 1.12–1.32)
CHLORIDE BLD-SCNC: 109 MMOL/L (ref 98–107)
CO2 BLD-SCNC: 25.3 MMOL/L (ref 21–32)
CREAT BLD-MCNC: 1.15 MG/DL (ref 0.6–1.3)
GLUCOSE BLD-MCNC: 116 MG/DL (ref 65–100)
POTASSIUM BLD-SCNC: 3.9 MMOL/L (ref 3.5–5.1)
SERVICE CMNT-IMP: ABNORMAL
SODIUM BLD-SCNC: 142 MMOL/L (ref 136–145)

## 2023-08-10 PROCEDURE — 6360000002 HC RX W HCPCS: Performed by: INTERNAL MEDICINE

## 2023-08-10 PROCEDURE — 80047 BASIC METABLC PNL IONIZED CA: CPT

## 2023-08-10 PROCEDURE — 2580000003 HC RX 258: Performed by: INTERNAL MEDICINE

## 2023-08-10 PROCEDURE — 96374 THER/PROPH/DIAG INJ IV PUSH: CPT

## 2023-08-10 RX ORDER — SODIUM CHLORIDE 0.9 % (FLUSH) 0.9 %
5-40 SYRINGE (ML) INJECTION PRN
OUTPATIENT
Start: 2024-08-08

## 2023-08-10 RX ORDER — SODIUM CHLORIDE 9 MG/ML
5-250 INJECTION, SOLUTION INTRAVENOUS PRN
OUTPATIENT
Start: 2024-08-08

## 2023-08-10 RX ORDER — SODIUM CHLORIDE 9 MG/ML
5-250 INJECTION, SOLUTION INTRAVENOUS PRN
Status: DISCONTINUED | OUTPATIENT
Start: 2023-08-10 | End: 2023-08-11 | Stop reason: HOSPADM

## 2023-08-10 RX ORDER — ACETAMINOPHEN 325 MG/1
650 TABLET ORAL
OUTPATIENT
Start: 2024-08-08

## 2023-08-10 RX ORDER — ZOLEDRONIC ACID 5 MG/100ML
5 INJECTION, SOLUTION INTRAVENOUS ONCE
OUTPATIENT
Start: 2024-08-08 | End: 2024-08-08

## 2023-08-10 RX ORDER — ZOLEDRONIC ACID 5 MG/100ML
5 INJECTION, SOLUTION INTRAVENOUS ONCE
Status: COMPLETED | OUTPATIENT
Start: 2023-08-10 | End: 2023-08-10

## 2023-08-10 RX ADMIN — SODIUM CHLORIDE 25 ML/HR: 9 INJECTION, SOLUTION INTRAVENOUS at 14:40

## 2023-08-10 RX ADMIN — ZOLEDRONIC ACID 5 MG: 0.05 INJECTION, SOLUTION INTRAVENOUS at 14:45

## 2023-08-10 ASSESSMENT — PAIN SCALES - GENERAL: PAINLEVEL_OUTOF10: 0

## 2024-06-27 LAB
25(OH)D3+25(OH)D2 SERPL-MCNC: 55.4 NG/ML (ref 30–100)
BUN SERPL-MCNC: 20 MG/DL (ref 8–27)
BUN/CREAT SERPL: 22 (ref 12–28)
CALCIUM SERPL-MCNC: 9.7 MG/DL (ref 8.7–10.3)
CHLORIDE SERPL-SCNC: 103 MMOL/L (ref 96–106)
CO2 SERPL-SCNC: 24 MMOL/L (ref 20–29)
CREAT SERPL-MCNC: 0.89 MG/DL (ref 0.57–1)
EGFRCR SERPLBLD CKD-EPI 2021: 74 ML/MIN/1.73
GLUCOSE SERPL-MCNC: 94 MG/DL (ref 70–99)
POTASSIUM SERPL-SCNC: 4.2 MMOL/L (ref 3.5–5.2)
SODIUM SERPL-SCNC: 140 MMOL/L (ref 134–144)

## 2024-07-11 ENCOUNTER — OFFICE VISIT (OUTPATIENT)
Age: 59
End: 2024-07-11
Payer: OTHER GOVERNMENT

## 2024-07-11 VITALS
TEMPERATURE: 98.8 F | OXYGEN SATURATION: 97 % | HEIGHT: 57 IN | WEIGHT: 115.3 LBS | DIASTOLIC BLOOD PRESSURE: 59 MMHG | BODY MASS INDEX: 24.88 KG/M2 | HEART RATE: 81 BPM | SYSTOLIC BLOOD PRESSURE: 96 MMHG

## 2024-07-11 DIAGNOSIS — E55.9 VITAMIN D DEFICIENCY, UNSPECIFIED: ICD-10-CM

## 2024-07-11 DIAGNOSIS — M81.0 AGE-RELATED OSTEOPOROSIS WITHOUT CURRENT PATHOLOGICAL FRACTURE: Primary | ICD-10-CM

## 2024-07-11 PROCEDURE — 99214 OFFICE O/P EST MOD 30 MIN: CPT | Performed by: INTERNAL MEDICINE

## 2024-07-11 NOTE — PROGRESS NOTES
[facility] DIABETES AND ENDOCRINOLOGY                 Henny Barrett MD            Patient Information     Name : Kayla Khan       YOB: 1965           Referred by: Bekah Hess NP         Chief complaint: Osteoporosis     History of Present Illness: Kayla Khan is here for follow-up of osteoporosis.      she was found to have osteoporosis based on the BMD.    + premature menopause at age 45   No  loss of height   + GERD/no PUD   Prior therapy : Actonel for 2 years, Boniva last 2 years   Last dose of Reclast was 2021, drug holiday, resumed it in 2023   No dental issues   No falls      Dietary calcium Intake:   Minimal amount of dairy in her diet.   On calcium and vitamin D supplements.        Wt Readings from Last 3 Encounters:   08/10/23 52.5 kg (115 lb 12.8 oz)   07/13/23 51.3 kg (113 lb)   02/10/22 54 kg (119 lb)        Past Medical History:   Diagnosis Date    Hyperlipidemia     Osteoporosis     Postmenopausal        Current Outpatient Medications   Medication Sig    CRESTOR 20 MG tablet Take 1 tablet by mouth daily    Multiple Vitamin (MULTIVITAMIN ADULT PO) Take 1 tablet by mouth daily    Vitamin D (CHOLECALCIFEROL) 25 MCG (1000 UT) TABS tablet Take 1 tablet by mouth daily    Calcium Carbonate-Vitamin D (OYSTER SHELL CALCIUM/D) 500-5 MG-MCG TABS Take 1 tablet by mouth daily    meloxicam (MOBIC) 7.5 MG tablet Take 1 tablet by mouth daily    omeprazole (PRILOSEC) 10 MG delayed release capsule Take 1 capsule by mouth as needed    aspirin 81 MG EC tablet Take 1 tablet by mouth daily (Patient not taking: Reported on 7/11/2024)    cetirizine (ZYRTEC) 10 MG tablet ceived the following from [facility]: Outside name: cetirizine (ZYRTEC) 10 mg tablet (Patient not taking: Reported on 7/11/2024)    clobetasol (TEMOVATE) 0.05 % cream ceived the following from [facility]: Outside name: clobetasol (TEMOVATE) 0.05 % topical cream (Patient not taking:  yes

## 2024-07-11 NOTE — PATIENT INSTRUCTIONS
Important     I have ordered medication/test and if you do not hear from the hospital in 7 business days  please call the number below for infusion center    [facility] [phone]    [facility]   [phone]

## 2024-08-12 ENCOUNTER — TELEPHONE (OUTPATIENT)
Age: 59
End: 2024-08-12

## 2024-08-12 DIAGNOSIS — M81.0 AGE-RELATED OSTEOPOROSIS WITHOUT CURRENT PATHOLOGICAL FRACTURE: Primary | ICD-10-CM

## 2024-08-12 RX ORDER — FAMOTIDINE 10 MG/ML
20 INJECTION, SOLUTION INTRAVENOUS
OUTPATIENT
Start: 2024-08-12

## 2024-08-12 RX ORDER — SODIUM CHLORIDE 9 MG/ML
5-250 INJECTION, SOLUTION INTRAVENOUS PRN
OUTPATIENT
Start: 2025-08-03

## 2024-08-12 RX ORDER — EPINEPHRINE 1 MG/ML
0.3 INJECTION, SOLUTION, CONCENTRATE INTRAVENOUS PRN
OUTPATIENT
Start: 2024-08-12

## 2024-08-12 RX ORDER — SODIUM CHLORIDE 9 MG/ML
5-250 INJECTION, SOLUTION INTRAVENOUS PRN
OUTPATIENT
Start: 2024-08-12

## 2024-08-12 RX ORDER — ONDANSETRON 2 MG/ML
8 INJECTION INTRAMUSCULAR; INTRAVENOUS
OUTPATIENT
Start: 2024-08-12

## 2024-08-12 RX ORDER — ACETAMINOPHEN 325 MG/1
650 TABLET ORAL
OUTPATIENT
Start: 2024-08-12

## 2024-08-12 RX ORDER — ZOLEDRONIC ACID 5 MG/100ML
5 INJECTION, SOLUTION INTRAVENOUS ONCE
OUTPATIENT
Start: 2024-08-12 | End: 2024-08-12

## 2024-08-12 RX ORDER — SODIUM CHLORIDE 0.9 % (FLUSH) 0.9 %
5-40 SYRINGE (ML) INJECTION PRN
OUTPATIENT
Start: 2025-08-03

## 2024-08-12 RX ORDER — SODIUM CHLORIDE 9 MG/ML
INJECTION, SOLUTION INTRAVENOUS CONTINUOUS
OUTPATIENT
Start: 2024-08-12

## 2024-08-12 RX ORDER — ALBUTEROL SULFATE 90 UG/1
4 AEROSOL, METERED RESPIRATORY (INHALATION) PRN
OUTPATIENT
Start: 2024-08-12

## 2024-08-12 RX ORDER — DIPHENHYDRAMINE HYDROCHLORIDE 50 MG/ML
50 INJECTION INTRAMUSCULAR; INTRAVENOUS
OUTPATIENT
Start: 2024-08-12

## 2024-08-12 RX ORDER — HEPARIN SODIUM (PORCINE) LOCK FLUSH IV SOLN 100 UNIT/ML 100 UNIT/ML
500 SOLUTION INTRAVENOUS PRN
OUTPATIENT
Start: 2024-08-12

## 2024-08-12 RX ORDER — ACETAMINOPHEN 325 MG/1
650 TABLET ORAL
OUTPATIENT
Start: 2025-08-03

## 2024-08-12 RX ORDER — SODIUM CHLORIDE 0.9 % (FLUSH) 0.9 %
5-40 SYRINGE (ML) INJECTION PRN
OUTPATIENT
Start: 2024-08-12

## 2024-08-12 RX ORDER — ZOLEDRONIC ACID 5 MG/100ML
5 INJECTION, SOLUTION INTRAVENOUS ONCE
OUTPATIENT
Start: 2025-08-03 | End: 2025-08-03

## 2024-08-12 NOTE — TELEPHONE ENCOUNTER
Patient was waiting to be scheduled for reclast but when called the number given, there was no order.  lws

## 2024-08-14 NOTE — TELEPHONE ENCOUNTER
Patient was advised by department there is no instruction on the orders so will not schedule for reclast

## 2024-08-21 ENCOUNTER — TELEPHONE (OUTPATIENT)
Age: 59
End: 2024-08-21

## 2024-08-21 NOTE — TELEPHONE ENCOUNTER
Department for infusion contacted patient that they need a referral. We have the same issue going on with another patient that went for outpatient testing but they stated we have to do a referral to the facility. She has had 4 infusions now ? May want to discuss further with department and Salma as we need to figure out if a different process is required.

## 2024-08-21 NOTE — TELEPHONE ENCOUNTER
----- Message from Tawnya Nayak [facility] sent at 8/21/2024  1:37 PM EDT -----  Regarding: RE: Bri  This is about her insurance requirements for prior auth:   AUTH STATUS & NOTES:   Prime patient - need referral to our facility from patient's PCM (referral on file from [employer] from 04/2024 for osteoporosis specifically states \"no facility selected; Services are authorized in provider's office only\"    She needs a referral from her Primary Care provider for . It does not have anything to do with her Rx or order with the [facility].    Does that help? I can put you in contact with our auth team as well if that is helpful.  ----- Message -----  From: Henny Barrett MD  Sent: 8/21/2024  12:32 PM EDT  To: Tangela Traylor MD; Tawnya Nayak [facility]; #  Subject: Reclast                                          Tawnya     I got a message from our PSRS as below    Department for infusion contacted patient that they need a referral. She has had 4 infusions now ? We never did referrals for  medication ,?    Do you know whom should we talk to understand better ?    They are making this patient run around so much, she is  same pt I contacted you last week as they could not see the order and then the instructions were not clear. .    Thanks

## 2024-08-30 ENCOUNTER — HOSPITAL ENCOUNTER (OUTPATIENT)
Facility: HOSPITAL | Age: 59
Setting detail: INFUSION SERIES
End: 2024-08-30

## 2024-09-06 ENCOUNTER — HOSPITAL ENCOUNTER (OUTPATIENT)
Facility: HOSPITAL | Age: 59
Setting detail: INFUSION SERIES
Discharge: HOME | End: 2024-09-06
Payer: OTHER GOVERNMENT

## 2024-09-06 VITALS
RESPIRATION RATE: 16 BRPM | OXYGEN SATURATION: 97 % | TEMPERATURE: 98 F | WEIGHT: 115 LBS | BODY MASS INDEX: 24.89 KG/M2 | HEART RATE: 82 BPM | DIASTOLIC BLOOD PRESSURE: 74 MMHG | SYSTOLIC BLOOD PRESSURE: 122 MMHG

## 2024-09-06 LAB
ALBUMIN SERPL-MCNC: 3.9 G/DL (ref 3.5–5)
ANION GAP SERPL CALC-SCNC: 8 MMOL/L (ref 2–12)
BUN SERPL-MCNC: 24 MG/DL (ref 6–20)
BUN/CREAT SERPL: 19 (ref 12–20)
CA-I BLD-MCNC: 9.6 MG/DL (ref 8.5–10.1)
CHLORIDE SERPL-SCNC: 106 MMOL/L (ref 97–108)
CO2 SERPL-SCNC: 24 MMOL/L (ref 21–32)
CREAT SERPL-MCNC: 1.29 MG/DL (ref 0.55–1.02)
GLUCOSE SERPL-MCNC: 115 MG/DL (ref 65–100)
PHOSPHATE SERPL-MCNC: 3.6 MG/DL (ref 2.6–4.7)
POTASSIUM SERPL-SCNC: 4.4 MMOL/L (ref 3.5–5.1)
SODIUM SERPL-SCNC: 138 MMOL/L (ref 136–145)

## 2024-09-06 PROCEDURE — 96374 THER/PROPH/DIAG INJ IV PUSH: CPT

## 2024-09-06 PROCEDURE — 96365 THER/PROPH/DIAG IV INF INIT: CPT

## 2024-09-06 PROCEDURE — 6360000002 HC RX W HCPCS: Performed by: INTERNAL MEDICINE

## 2024-09-06 PROCEDURE — 80069 RENAL FUNCTION PANEL: CPT

## 2024-09-06 RX ORDER — ACETAMINOPHEN 325 MG/1
650 TABLET ORAL EVERY 8 HOURS PRN
Status: DISCONTINUED | OUTPATIENT
Start: 2024-09-06 | End: 2024-09-07 | Stop reason: HOSPADM

## 2024-09-06 RX ORDER — ZOLEDRONIC ACID 5 MG/100ML
5 INJECTION, SOLUTION INTRAVENOUS ONCE
Status: COMPLETED | OUTPATIENT
Start: 2024-09-06 | End: 2024-09-06

## 2024-09-06 RX ADMIN — ZOLEDRONIC ACID 5 MG: 0.05 INJECTION, SOLUTION INTRAVENOUS at 15:44

## 2024-09-06 ASSESSMENT — PAIN SCALES - GENERAL
PAINLEVEL_OUTOF10: 0
PAINLEVEL_OUTOF10: 0

## 2024-09-06 NOTE — PROGRESS NOTES
Discharged  with  instructions  advised  need to see  md  and  have  new  order for  next  dose  next  year  pt   verbalized understands reclast given  as  ordered no reddness swelling  or  bruising  noted  at  iv  site  iv d/c'd , 2x2  and  paper tape  applied pt hira well

## 2024-09-06 NOTE — PROGRESS NOTES
Patient here to day for Reclast infusion per MD order. Patient settled into SDS chair for comfort. Warm blankets given. Food/drink offered. Call bell in reach. VS stable. States she is doing well. IV started to left hand without difficulty. Labwork drawn for renal panel and sent to lab. Awaiting results. See doc flow sheets.

## 2024-11-26 NOTE — PROGRESS NOTES
Can we reach out to pathology to get them to order molecular testing on samples a and c? Claribel Henriquez is a 59 y.o. female here for   Chief Complaint   Patient presents with    Osteoporosis       1. Have you been to the ER, urgent care clinic since your last visit? Hospitalized since your last visit? -no    2. Have you seen or consulted any other health care providers outside of the [address] [place] [address] since your last visit?   Include any pap smears or colon screening.-no

## 2025-08-09 LAB
25(OH)D3+25(OH)D2 SERPL-MCNC: 59.1 NG/ML (ref 30–100)
BUN SERPL-MCNC: 18 MG/DL (ref 8–27)
BUN/CREAT SERPL: 22 (ref 12–28)
CALCIUM SERPL-MCNC: 9.8 MG/DL (ref 8.7–10.3)
CHLORIDE SERPL-SCNC: 103 MMOL/L (ref 96–106)
CO2 SERPL-SCNC: 24 MMOL/L (ref 20–29)
CREAT SERPL-MCNC: 0.83 MG/DL (ref 0.57–1)
EGFRCR SERPLBLD CKD-EPI 2021: 80 ML/MIN/1.73
GLUCOSE SERPL-MCNC: 88 MG/DL (ref 70–99)
POTASSIUM SERPL-SCNC: 4.4 MMOL/L (ref 3.5–5.2)
SODIUM SERPL-SCNC: 140 MMOL/L (ref 134–144)

## 2025-08-18 ENCOUNTER — OFFICE VISIT (OUTPATIENT)
Age: 60
End: 2025-08-18
Payer: OTHER GOVERNMENT

## 2025-08-18 VITALS
WEIGHT: 112 LBS | BODY MASS INDEX: 24.16 KG/M2 | SYSTOLIC BLOOD PRESSURE: 123 MMHG | DIASTOLIC BLOOD PRESSURE: 69 MMHG | HEART RATE: 74 BPM | TEMPERATURE: 98.2 F | OXYGEN SATURATION: 96 % | HEIGHT: 57 IN

## 2025-08-18 DIAGNOSIS — M81.0 AGE-RELATED OSTEOPOROSIS WITHOUT CURRENT PATHOLOGICAL FRACTURE: Primary | ICD-10-CM

## 2025-08-18 DIAGNOSIS — E55.9 VITAMIN D DEFICIENCY, UNSPECIFIED: ICD-10-CM

## 2025-08-18 PROCEDURE — 99214 OFFICE O/P EST MOD 30 MIN: CPT | Performed by: INTERNAL MEDICINE

## 2025-08-27 PROBLEM — M15.9 OSTEOARTHRITIS OF MULTIPLE JOINTS: Status: ACTIVE | Noted: 2018-03-06

## 2025-08-27 PROBLEM — D23.0 OTHER BENIGN NEOPLASM OF SKIN OF LIP: Status: ACTIVE | Noted: 2025-08-27

## 2025-08-27 PROBLEM — Z82.49 FH: PREMATURE CORONARY HEART DISEASE: Status: ACTIVE | Noted: 2022-09-21

## 2025-08-27 PROBLEM — D23.5 OTHER BENIGN NEOPLASM OF SKIN OF TRUNK: Status: ACTIVE | Noted: 2025-08-27

## 2025-08-27 RX ORDER — CETIRIZINE HYDROCHLORIDE 10 MG/1
10 TABLET ORAL DAILY
COMMUNITY
Start: 2025-03-27